# Patient Record
Sex: FEMALE | Race: WHITE | URBAN - METROPOLITAN AREA
[De-identification: names, ages, dates, MRNs, and addresses within clinical notes are randomized per-mention and may not be internally consistent; named-entity substitution may affect disease eponyms.]

---

## 2022-12-06 ENCOUNTER — OFFICE VISIT (OUTPATIENT)
Dept: URBAN - METROPOLITAN AREA CLINIC 7 | Facility: CLINIC | Age: 52
End: 2022-12-06
Payer: COMMERCIAL

## 2022-12-06 DIAGNOSIS — M33.13 DERMATOMYOSITIS: Primary | ICD-10-CM

## 2022-12-06 PROCEDURE — 92134 CPTRZ OPH DX IMG PST SGM RTA: CPT | Performed by: OPHTHALMOLOGY

## 2022-12-06 PROCEDURE — 92250 FUNDUS PHOTOGRAPHY W/I&R: CPT | Performed by: OPHTHALMOLOGY

## 2022-12-06 PROCEDURE — 99214 OFFICE O/P EST MOD 30 MIN: CPT | Performed by: OPHTHALMOLOGY

## 2022-12-06 ASSESSMENT — INTRAOCULAR PRESSURE
OS: 16
OD: 17

## 2022-12-06 NOTE — IMPRESSION/PLAN
Impression: Dermatomyositis: M33.13.
on Plaquenil since 2011 Plan: She was lost to f/u due to [place] and returns for eval. Exam shows no evidence of plaquenil toxicity. SD-OCT shows no IS/OS changes. Autofluorescence is within normal limitis OU today. We discussed the findings and natural history. I recommend observation and continue f/u with her dermatologist. she will f/u with her primary eye care provider for HVF testing. She will call us with any new visual changes.  
RTC 1 year OCT/autofluorescence OU